# Patient Record
(demographics unavailable — no encounter records)

---

## 2025-01-17 NOTE — DISCUSSION/SUMMARY
[FreeTextEntry1] : In summary, this is a 57-year-old man with very rare but symptomatic PVCs.  He presents today interested in pursuing ablation but I advised him at this burden that it was unlikely to be a successful procedure.  I offered him reassurance regarding the rare and benign nature of his arrhythmia. I again counseled alcohol cessation, as I suspect this is driving a decent burden of his arrhythmia.  He appeared to understand the whole discussion and verbalized that all of his questions were answered to his satisfaction.  Thank you for allowing me to be involved in the care of this pleasant man. Please feel free to contact me with any questions.    Jesus Meyers MD  of Cardiology Electrophysiology Section [address] [place] [address], [address] [place] [address] Office: [phone] Fax: [phone]  [EKG obtained to assist in diagnosis and management of assessed problem(s)] : EKG obtained to assist in diagnosis and management of assessed problem(s)

## 2025-01-17 NOTE — DISCUSSION/SUMMARY
[FreeTextEntry1] : In summary, this is a 57-year-old man with very rare but symptomatic PVCs.  He presents today interested in pursuing ablation but I advised him at this burden that it was unlikely to be a successful procedure.  I offered him reassurance regarding the rare and benign nature of his arrhythmia. I again counseled alcohol cessation, as I suspect this is driving a decent burden of his arrhythmia.  He appeared to understand the whole discussion and verbalized that all of his questions were answered to his satisfaction.  Thank you for allowing me to be involved in the care of this pleasant man. Please feel free to contact me with any questions.    Jesus Meyers MD  of Cardiology Electrophysiology Section [address] [place] [address], [address] [place] [address] Office: [phone] Fax: [phone]  [EKG obtained to assist in diagnosis and management of assessed problem(s)] : EKG obtained to assist in diagnosis and management of assessed problem(s)

## 2025-01-17 NOTE — CARDIOLOGY SUMMARY
[de-identified] : 1/16/25: Sinus rhythm 12/5/2022: Sinus bradycardia at 55 bpm [de-identified] : 9/3/24: 1. Left ventricular cavity is normal in size. Left ventricular systolic function is normal with an ejection fraction visually estimated at 60 to 65 %. 2. There is normal LV mass and normal geometry. 3. Normal right ventricular cavity size and normal right ventricular systolic function. 4. Left atrium is normal in size. 5. Structurally normal mitral valve with normal leaflet excursion. 6. Mild mitral regurgitation. 7. Trileaflet aortic valve with normal systolic excursion. There is focal calcification of the aortic valve leaflets. 8. Estimated pulmonary artery systolic pressure is 29 mmHg, consistent with normal pulmonary artery pressure. 9. Compared to the transthoracic echocardiogram performed on 12/1/2022, there have been no significant interval changes.

## 2025-01-17 NOTE — HISTORY OF PRESENT ILLNESS
[FreeTextEntry1] : Mr. Roel Cohen presents today to the cardiac electrophysiology clinic.  He is a 57-year-old man with a history of hypertension, CAD, alcoholism, PVCs status post ablation at [facility].  He presents today for evaluation of palpitations, which are similar in nature to those preceding his prior PVC ablation.  He notes that symptoms occur frequently throughout the day, manifesting as an uncomfortable isolated "pounding beat" inside his chest.  Symptoms have persisted despite Toprol 100 mg daily.  He is unwilling to quantify exactly how much he drinks but notes that he does so daily.  Yesterday, he drank throughout the day while watching football and-while also in the context of forgetting to take his medications this morning-is having more profound symptoms today.  He works out daily and can do so without limitation.  He denies any incidence of chest pain, shortness of breath, dizziness or syncope.  2-week event monitoring (11/22) showed rare PVCs at burden less than 1%.  A repeat monitor done 11124 also showed PVCs and APCs at < 1% which correlated with symptoms triggers. He admits to drinking heavily but declines to specify quantity.

## 2025-01-17 NOTE — CARDIOLOGY SUMMARY
[de-identified] : 1/16/25: Sinus rhythm 12/5/2022: Sinus bradycardia at 55 bpm [de-identified] : 9/3/24: 1. Left ventricular cavity is normal in size. Left ventricular systolic function is normal with an ejection fraction visually estimated at 60 to 65 %. 2. There is normal LV mass and normal geometry. 3. Normal right ventricular cavity size and normal right ventricular systolic function. 4. Left atrium is normal in size. 5. Structurally normal mitral valve with normal leaflet excursion. 6. Mild mitral regurgitation. 7. Trileaflet aortic valve with normal systolic excursion. There is focal calcification of the aortic valve leaflets. 8. Estimated pulmonary artery systolic pressure is 29 mmHg, consistent with normal pulmonary artery pressure. 9. Compared to the transthoracic echocardiogram performed on 12/1/2022, there have been no significant interval changes.

## 2025-01-17 NOTE — HISTORY OF PRESENT ILLNESS
[FreeTextEntry1] : Mr. Roel Cohen presents today to the cardiac electrophysiology clinic.  He is a 57-year-old man with a history of hypertension, CAD, alcoholism, PVCs status post ablation at [facility].  He presents today for evaluation of palpitations, which are similar in nature to those preceding his prior PVC ablation.  He notes that symptoms occur frequently throughout the day, manifesting as an uncomfortable isolated "pounding beat" inside his chest.  Symptoms have persisted despite Toprol 100 mg daily.  He is unwilling to quantify exactly how much he drinks but notes that he does so daily.  Yesterday, he drank throughout the day while watching football and-while also in the context of forgetting to take his medications this morning-is having more profound symptoms today.  He works out daily and can do so without limitation.  He denies any incidence of chest pain, shortness of breath, dizziness or syncope.  2-week event monitoring (11/22) showed rare PVCs at burden less than 1%.  A repeat monitor done 11124 also showed PVCs and APCs at < 1% which correlated with symptoms triggers. He admits to drinking heavily but declines to specify quantity.